# Patient Record
Sex: FEMALE | Race: WHITE | NOT HISPANIC OR LATINO | Employment: FULL TIME | URBAN - METROPOLITAN AREA
[De-identification: names, ages, dates, MRNs, and addresses within clinical notes are randomized per-mention and may not be internally consistent; named-entity substitution may affect disease eponyms.]

---

## 2020-01-07 ENCOUNTER — TELEPHONE (OUTPATIENT)
Dept: GASTROENTEROLOGY | Facility: CLINIC | Age: 60
End: 2020-01-07

## 2020-01-27 NOTE — TELEPHONE ENCOUNTER
Dr Todd Parents has been contacted to reschedule cancelled recall colon with no success-please advise   Thank you

## 2020-02-24 ENCOUNTER — CLINICAL SUPPORT (OUTPATIENT)
Dept: GASTROENTEROLOGY | Facility: CLINIC | Age: 60
End: 2020-02-24

## 2020-02-24 VITALS — HEIGHT: 69 IN | BODY MASS INDEX: 18.51 KG/M2 | WEIGHT: 125 LBS

## 2020-02-24 DIAGNOSIS — Z86.010 HX OF COLONIC POLYPS: Primary | ICD-10-CM

## 2020-02-24 RX ORDER — LISINOPRIL AND HYDROCHLOROTHIAZIDE 12.5; 1 MG/1; MG/1
1 TABLET ORAL DAILY
COMMUNITY

## 2020-02-24 NOTE — PROGRESS NOTES
Pt seen for colon prep; meds reviewed; cleniq instructions given and reviewed; rx forwarded to provider for approval

## 2025-02-04 ENCOUNTER — PREP FOR PROCEDURE (OUTPATIENT)
Age: 65
End: 2025-02-04

## 2025-02-04 ENCOUNTER — TELEPHONE (OUTPATIENT)
Age: 65
End: 2025-02-04

## 2025-02-04 DIAGNOSIS — Z86.0100 HISTORY OF COLON POLYPS: Primary | ICD-10-CM

## 2025-02-04 NOTE — TELEPHONE ENCOUNTER
02/04/25  Screened by: Lazara Stephens    Referring Provider Lolis    Pre- Screening:     There is no height or weight on file to calculate BMI.17  Has patient been referred for a routine screening Colonoscopy? yes  Is the patient between 45-75 years old? yes      Previous Colonoscopy yes   If yes:    Date:     Facility:     Reason:           Does the patient want to see a Gastroenterologist prior to their procedure OR are they having any GI symptoms? no    Has the patient been hospitalized or had abdominal surgery in the past 6 months? no    Does the patient use supplemental oxygen? no    Does the patient take Coumadin, Lovenox, Plavix, Elliquis, Xarelto, or other blood thinning medication? no    Has the patient had a stroke, cardiac event, or stent placed in the past year? no        If patient is between 45yrs - 49yrs, please advise patient that we will have to confirm benefits & coverage with their insurance company for a routine screening colonoscopy.

## 2025-02-04 NOTE — TELEPHONE ENCOUNTER
Scheduled date of colonoscopy (as of today):3/7/25  Physician performing colonoscopy:Dr. Ochoa  Location of colonoscopy:[facility]  Bowel prep reviewed with patient:Rayo/dul prep instr sent to pt's email [email or web address]   Instructions reviewed with patient by:KRISS  Clearances: N/A

## 2025-02-10 ENCOUNTER — TELEPHONE (OUTPATIENT)
Dept: GASTROENTEROLOGY | Facility: CLINIC | Age: 65
End: 2025-02-10

## 2025-02-10 NOTE — TELEPHONE ENCOUNTER
----- Message from Kamini OLIVA sent at 2/10/2025  3:51 PM EST -----  Regarding: [facility] [employer] inurance issues  3/7/25, [facility] [employer] insurance issues. Please reschedule to the hospital. Thank you.

## 2025-02-11 NOTE — TELEPHONE ENCOUNTER
Left message for patient to reschedule colon to the hospital due to [facility] not contracted with patient insurance.

## 2025-02-11 NOTE — TELEPHONE ENCOUNTER
Pt called back to reschedule her colonoscopy. Pt wanted to reschedule at [place], transferred call to Alessandro

## 2025-02-12 ENCOUNTER — TELEPHONE (OUTPATIENT)
Dept: GASTROENTEROLOGY | Facility: CLINIC | Age: 65
End: 2025-02-12

## 2025-02-12 DIAGNOSIS — Z86.0100 HISTORY OF COLON POLYPS: Primary | ICD-10-CM

## 2025-02-28 ENCOUNTER — TELEPHONE (OUTPATIENT)
Dept: GASTROENTEROLOGY | Facility: CLINIC | Age: 65
End: 2025-02-28

## 2025-02-28 NOTE — TELEPHONE ENCOUNTER
Procedure confirmed  Colonoscopy     Via: Spoke with patient.      Instructions given: Email     Prep Given: Clenpiq    Call the office if there are any questions.

## 2025-03-17 ENCOUNTER — TELEPHONE (OUTPATIENT)
Dept: GASTROENTEROLOGY | Facility: CLINIC | Age: 65
End: 2025-03-17

## 2025-03-17 NOTE — TELEPHONE ENCOUNTER
Spoke with patient reviewed results and recommendations per Dr. Watson.        ----- Message from Maria A Watson MD sent at 3/13/2025  9:35 AM EDT -----  Clinical team-can you reach out to the patient and let them know about the results of the pathology.  Recommendations as noted below.  Thank you.    Yulisa Bah,    We got the results of the polyp that we removed from your colon. This was a a sessile serrated lesion which are precancerous lesions that we removed. No concerns of cancer. We would recommend you to have a repeat colonoscopy in 3 years.     Please let us know if you have any questions or concerns.  Thank you  ----- Message -----  From: Radha Bedoya  Sent: 3/13/2025   7:36 AM EDT  To: Maria A Watson MD

## 2025-04-02 ENCOUNTER — TELEPHONE (OUTPATIENT)
Dept: GASTROENTEROLOGY | Facility: AMBULARY SURGERY CENTER | Age: 65
End: 2025-04-02

## 2025-04-02 NOTE — TELEPHONE ENCOUNTER
Pt called regarding a billing issue. She had a colonoscopy done on 3/11/25 that was coded as diagnostic. She states that is should be preventative. Please assist.